# Patient Record
Sex: MALE | Race: OTHER | NOT HISPANIC OR LATINO
[De-identification: names, ages, dates, MRNs, and addresses within clinical notes are randomized per-mention and may not be internally consistent; named-entity substitution may affect disease eponyms.]

---

## 2018-05-04 ENCOUNTER — TRANSCRIPTION ENCOUNTER (OUTPATIENT)
Age: 23
End: 2018-05-04

## 2018-11-14 ENCOUNTER — TRANSCRIPTION ENCOUNTER (OUTPATIENT)
Age: 23
End: 2018-11-14

## 2020-02-25 ENCOUNTER — TRANSCRIPTION ENCOUNTER (OUTPATIENT)
Age: 25
End: 2020-02-25

## 2020-04-12 ENCOUNTER — TRANSCRIPTION ENCOUNTER (OUTPATIENT)
Age: 25
End: 2020-04-12

## 2020-04-13 PROBLEM — Z00.00 ENCOUNTER FOR PREVENTIVE HEALTH EXAMINATION: Status: ACTIVE | Noted: 2020-04-13

## 2020-05-11 ENCOUNTER — APPOINTMENT (OUTPATIENT)
Dept: OTOLARYNGOLOGY | Facility: CLINIC | Age: 25
End: 2020-05-11
Payer: COMMERCIAL

## 2020-05-11 VITALS
WEIGHT: 165 LBS | OXYGEN SATURATION: 99 % | SYSTOLIC BLOOD PRESSURE: 132 MMHG | BODY MASS INDEX: 27.49 KG/M2 | HEART RATE: 60 BPM | HEIGHT: 65 IN | TEMPERATURE: 97.5 F | DIASTOLIC BLOOD PRESSURE: 80 MMHG

## 2020-05-11 DIAGNOSIS — Z82.49 FAMILY HISTORY OF ISCHEMIC HEART DISEASE AND OTHER DISEASES OF THE CIRCULATORY SYSTEM: ICD-10-CM

## 2020-05-11 DIAGNOSIS — Z87.09 PERSONAL HISTORY OF OTHER DISEASES OF THE RESPIRATORY SYSTEM: ICD-10-CM

## 2020-05-11 DIAGNOSIS — R42 DIZZINESS AND GIDDINESS: ICD-10-CM

## 2020-05-11 DIAGNOSIS — Z78.9 OTHER SPECIFIED HEALTH STATUS: ICD-10-CM

## 2020-05-11 DIAGNOSIS — Z72.89 OTHER PROBLEMS RELATED TO LIFESTYLE: ICD-10-CM

## 2020-05-11 PROCEDURE — 99204 OFFICE O/P NEW MOD 45 MIN: CPT

## 2020-05-11 PROCEDURE — 92550 TYMPANOMETRY & REFLEX THRESH: CPT

## 2020-05-11 PROCEDURE — 92540 BASIC VESTIBULAR EVALUATION: CPT

## 2020-05-11 PROCEDURE — 92557 COMPREHENSIVE HEARING TEST: CPT

## 2020-05-11 PROCEDURE — 92537 CALORIC VSTBLR TEST W/REC: CPT

## 2020-05-11 RX ORDER — ALBUTEROL SULFATE 90 UG/1
INHALANT RESPIRATORY (INHALATION)
Refills: 0 | Status: ACTIVE | COMMUNITY

## 2020-05-11 NOTE — ASSESSMENT
[FreeTextEntry1] : 24M w/ 2 months of vertigo, Marissa hallpike negative, r/o other inner ear pathology, otherwise possibly vestibular migraine consider stress.; said his heart checked out negative. I explained MD at length to show his sx were not consistent. \par \par Plan:\par - hearing test\par - VNG\par - f/u outside MRI\par - new mri with agd\par rtc with tests

## 2020-05-11 NOTE — HISTORY OF PRESENT ILLNESS
[de-identified] : 24M who presents with episodic vertigo x 2 months. Patient reports two months ago he developed the first of three vertigo attacks, began with panic/anxiety episode,  which lasted 30 sec and resolved on its own with associated dysequilibrium following. He also admits to +N/V during the episodes. he then continued to get two more vertigo episodes a couple weeks later. He admits that he had been having a lot of anxiety leading up to the vertigo and reports caffeine, alcohol and visual focus worsen the sensation, if reads test moves. He denies any f/c/s, URi symptoms, otalgia, otorrhea, hearing changes or tinnitus. Has FH for meniere's nothing makes it better. tried meclizine prescribed by urgent care but it just makes him tired. states he was hit with a baseball at age 11 and had mri and told he had a cyst; he  says he had recent one no tracey and was told it was normal. nonsmoker. no drug use.\par \par Of note, he also says he had one other episodes similar to this when he was a child that was not worked up. He as well went to an urgent care where he was sent for MRI brain which they state is "normal".\par \par No pertinent SH. + FH of Menieres disease.

## 2020-05-11 NOTE — PHYSICAL EXAM
[Midline] : trachea located in midline position [Normal] : no rashes [] : [place]-Hallpike test is negative [de-identified] : gait steady

## 2020-08-22 ENCOUNTER — TRANSCRIPTION ENCOUNTER (OUTPATIENT)
Age: 25
End: 2020-08-22

## 2020-09-01 ENCOUNTER — TRANSCRIPTION ENCOUNTER (OUTPATIENT)
Age: 25
End: 2020-09-01

## 2024-01-04 ENCOUNTER — NON-APPOINTMENT (OUTPATIENT)
Age: 29
End: 2024-01-04